# Patient Record
(demographics unavailable — no encounter records)

---

## 2024-11-12 NOTE — HISTORY OF PRESENT ILLNESS
[de-identified] : 34 year old female presents for left ear issues Ongoing 1+ year- States intermittent Left ear sharp pain States yday she heard echoing in Left ear  no hearing loss Muffled hearing Intermittent tinnitus, no vertigo neg pmh re ears

## 2024-11-12 NOTE — ASSESSMENT
[FreeTextEntry1] : exam unremarkable as hearing distortion resolved intermittent otalgia-?tmj f/u prn

## 2024-11-12 NOTE — REVIEW OF SYSTEMS
[As Noted in HPI] : as noted in HPI [Ear Pain] : ear pain [Hearing Loss] : hearing loss [Ear Noises] : ear noises [Patient Intake Form Reviewed] : Patient intake form was reviewed [Ear Itch] : no ear itch [Dizziness] : no dizziness [Vertigo] : no vertigo [Recurrent Ear Infections] : no recurrent ear infections [Lightheadedness] : no lightheadedness [Ear Drainage] : no ear drainage [Negative] : Heme/Lymph

## 2024-11-13 NOTE — HEALTH RISK ASSESSMENT
[Patient reported PAP Smear was normal] : Patient reported PAP Smear was normal [4 or more  times a week (4 pts)] : 4 or more  times a week (4 points) [3 or 4 (1 pt)] : 3 or 4  (1 point) [Never (0 pts)] : Never (0 points) [Yes] : In the past 12 months have you used drugs other than those required for medical reasons? Yes [0] : 1) Little interest or pleasure doing things: Not at all (0) [1] : 2) Feeling down, depressed, or hopeless for several days (1) [PHQ-2 Negative - No further assessment needed] : PHQ-2 Negative - No further assessment needed [Never] : Never [With Family] : lives with family [Employed] : employed [] :  [# Of Children ___] : has [unfilled] children [Feels Safe at Home] : Feels safe at home [Fully functional (bathing, dressing, toileting, transferring, walking, feeding)] : Fully functional (bathing, dressing, toileting, transferring, walking, feeding) [Fully functional (using the telephone, shopping, preparing meals, housekeeping, doing laundry, using] : Fully functional and needs no help or supervision to perform IADLs (using the telephone, shopping, preparing meals, housekeeping, doing laundry, using transportation, managing medications and managing finances) [Reports changes in hearing] : Reports changes in hearing [de-identified] : marijuana [[id number]] : 1 [Reports changes in vision] : Reports no changes in vision [PapSmearDate] : 06/24 [PapSmearComments] : Dr. 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

## 2024-11-13 NOTE — HISTORY OF PRESENT ILLNESS
[de-identified] : 35 y/o female with pmhx of hpylori (never treated) presents for cpe. Patient states that she has been having a sharp pain in the left lower quadrant since her  about 2 years ago. States it only happens when she drinks alcohol. Denies nauseas, vomiting. Admits to diarrhea which she attributes it to stress. States the pain is about a 3/10. It goes away on its own and is a sharp stabbing pain. Patient has been having regular periods, LMP was about 2-3 weeks ago. On oral contraceptive.

## 2024-11-13 NOTE — PHYSICAL EXAM
[No Edema] : there was no peripheral edema [No Focal Deficits] : no focal deficits [Normal] : soft, non-tender, non-distended, no masses palpated, no HSM and normal bowel sounds

## 2024-11-13 NOTE — REVIEW OF SYSTEMS
[Chest Pain] : chest pain [Fever] : no fever [Chills] : no chills [Palpitations] : no palpitations [Shortness Of Breath] : no shortness of breath [Wheezing] : no wheezing [Abdominal Pain] : no abdominal pain [Nausea] : no nausea [Vomiting] : no vomiting [Headache] : no headache [Dizziness] : no dizziness

## 2024-11-13 NOTE — ASSESSMENT
[FreeTextEntry1] : CPE: -will order routine lab work -medical history including surgical history, family history, and current medications reviewed and documented as above -Age appropriate screenings including but not limited to cancer screening, alcohol misuse (audit-c) and vision screening as documented above -patient counseled on healthy diet and lifestyle changes including increasing physical activity and eating a diet low in processed foods and high in fruits and vegetables -counseled patient on age appropriate vaccinations and immunization record updated as above  H pylori infection - never treated as patient did not take medications advised patient to contact gi to ensure proper treatment and follow up. discussed risks of untreated hpylori including risk of stomach cancer  Atypical chest pain likely 2/2 to gerd previously evaluated by cardio - recommend follow up  Left lower quadrant abdominal pain - intermittent and only with alcohol use recommend abstaining form alocjol use of greatly diminishing use no findings on exam - may be scar tissue s/p csection or due to untreated hpylori infection

## 2024-11-13 NOTE — HISTORY OF PRESENT ILLNESS
[de-identified] : 33 y/o female with pmhx of hpylori (never treated) presents for cpe. Patient states that she has been having a sharp pain in the left lower quadrant since her  about 2 years ago. States it only happens when she drinks alcohol. Denies nauseas, vomiting. Admits to diarrhea which she attributes it to stress. States the pain is about a 3/10. It goes away on its own and is a sharp stabbing pain. Patient has been having regular periods, LMP was about 2-3 weeks ago. On oral contraceptive.

## 2024-11-13 NOTE — HEALTH RISK ASSESSMENT
[Patient reported PAP Smear was normal] : Patient reported PAP Smear was normal [4 or more  times a week (4 pts)] : 4 or more  times a week (4 points) [3 or 4 (1 pt)] : 3 or 4  (1 point) [Never (0 pts)] : Never (0 points) [Yes] : In the past 12 months have you used drugs other than those required for medical reasons? Yes [0] : 1) Little interest or pleasure doing things: Not at all (0) [1] : 2) Feeling down, depressed, or hopeless for several days (1) [PHQ-2 Negative - No further assessment needed] : PHQ-2 Negative - No further assessment needed [Never] : Never [With Family] : lives with family [Employed] : employed [] :  [# Of Children ___] : has [unfilled] children [Feels Safe at Home] : Feels safe at home [Fully functional (bathing, dressing, toileting, transferring, walking, feeding)] : Fully functional (bathing, dressing, toileting, transferring, walking, feeding) [Fully functional (using the telephone, shopping, preparing meals, housekeeping, doing laundry, using] : Fully functional and needs no help or supervision to perform IADLs (using the telephone, shopping, preparing meals, housekeeping, doing laundry, using transportation, managing medications and managing finances) [Reports changes in hearing] : Reports changes in hearing [de-identified] : marijuana [[id number]] : 1 [Reports changes in vision] : Reports no changes in vision [PapSmearDate] : 06/24 [PapSmearComments] : Dr. 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

## 2025-01-30 NOTE — PHYSICAL EXAM
[Chaperone Present] : A chaperone was present in the examining room during all aspects of the physical examination [[id number]] : A chaperone was present during the pelvic exam. [Appropriately responsive] : appropriately responsive [FreeTextEntry2] : Christy [Alert] : alert [No Acute Distress] : no acute distress [No Lymphadenopathy] : no lymphadenopathy [Regular Rate Rhythm] : regular rate rhythm [No Murmurs] : no murmurs [Clear to Auscultation B/L] : clear to auscultation bilaterally [Soft] : soft [Non-tender] : non-tender [Non-distended] : non-distended [No HSM] : No HSM [No Lesions] : no lesions [No Mass] : no mass [Oriented x3] : oriented x3 [Examination Of The Breasts] : a normal appearance [No Masses] : no breast masses were palpable [Labia Majora] : normal [Labia Minora] : normal [Normal] : normal [Uterine Adnexae] : normal [Declined] : Patient declined rectal exam

## 2025-01-30 NOTE — HISTORY OF PRESENT ILLNESS
[Y] : Positive pregnancy history [Regular Cycle Intervals] : periods have been regular [Frequency: Q ___ days] : menstrual periods occur approximately every [unfilled] days [Menarche Age: ____] : age at menarche was [unfilled] [FreeTextEntry1] : 34-year-old -1-0-1 last menstrual cycle was January 15, 2025 presents for GYN evaluation she ran out of pills birth control pills and prefers to continue with it no headaches epigastric pain or blurry vision. [PGxTotal] : 1 [[facility] CampusxFulerm] : 1 [PGHxPremature] : 0 [PGHxAbortions] : 0 [[facility] Centeriving] : 1 [PGHxABInduced] : 0 [PGHxABSpont] : 0 [PGHxEctopic] : 0 [PGHxMultBirths] : 0

## 2025-01-30 NOTE — DISCUSSION/SUMMARY
[FreeTextEntry1] : 34-year-old -1-0-1 presents for GYN evaluation.  She is sexually active on birth control pills but ran out last week and has not been on the pill for a week.  Urine pregnancy test is negative and she is encouraged to start the pill today.  Physical examination is normal.  Pap GC chlamydia sent.  Contraceptive measures discussed at length.  Diet and exercise discussed.  Return to the office in 6 months for follow-up.

## 2025-06-04 NOTE — HISTORY OF PRESENT ILLNESS
[FreeTextEntry1] : follow up of chronic medical conditions [de-identified] : 35 y/o female with pmhx of hpylori (never treated) presents for follow up. Patient with intermittent episodes of right upper quadrant abdominal pain but only with alcohol use. She is not interested in going for a US at this time. She has a history of hpylori but was never treated. She has not yet followed up on thi

## 2025-06-04 NOTE — ASSESSMENT
[FreeTextEntry1] : HLD - most recent lipid panel elevated, will repeat today  Prediabetes - most recent hgba1c at 6.0, will repeat today  H pylori infection - never treated as patient did not take medications will send for repeat stool test  right upper quadrant abdominal pain - intermittent and only with alcohol use continue abstaining from alcohol use recommend ruq ultrasound, patient would like to hold off at this time